# Patient Record
Sex: MALE | Race: WHITE | NOT HISPANIC OR LATINO | Employment: FULL TIME | URBAN - METROPOLITAN AREA
[De-identification: names, ages, dates, MRNs, and addresses within clinical notes are randomized per-mention and may not be internally consistent; named-entity substitution may affect disease eponyms.]

---

## 2017-04-27 ENCOUNTER — GENERIC CONVERSION - ENCOUNTER (OUTPATIENT)
Dept: OTHER | Facility: OTHER | Age: 37
End: 2017-04-27

## 2017-06-20 ENCOUNTER — GENERIC CONVERSION - ENCOUNTER (OUTPATIENT)
Dept: OTHER | Facility: OTHER | Age: 37
End: 2017-06-20

## 2017-08-10 ENCOUNTER — GENERIC CONVERSION - ENCOUNTER (OUTPATIENT)
Dept: OTHER | Facility: OTHER | Age: 37
End: 2017-08-10

## 2017-11-13 ENCOUNTER — ALLSCRIPTS OFFICE VISIT (OUTPATIENT)
Dept: OTHER | Facility: OTHER | Age: 37
End: 2017-11-13

## 2018-01-22 VITALS
HEIGHT: 69 IN | WEIGHT: 230.38 LBS | SYSTOLIC BLOOD PRESSURE: 136 MMHG | BODY MASS INDEX: 34.12 KG/M2 | DIASTOLIC BLOOD PRESSURE: 78 MMHG

## 2018-05-31 DIAGNOSIS — I10 HYPERTENSION, UNSPECIFIED TYPE: Primary | ICD-10-CM

## 2018-05-31 RX ORDER — UREA 10 %
LOTION (ML) TOPICAL
COMMUNITY

## 2018-05-31 RX ORDER — TADALAFIL 5 MG/1
TABLET ORAL DAILY
COMMUNITY
Start: 2016-01-20 | End: 2018-07-09 | Stop reason: SDUPTHER

## 2018-05-31 RX ORDER — HYDROCHLOROTHIAZIDE 12.5 MG/1
1 CAPSULE, GELATIN COATED ORAL DAILY
COMMUNITY
Start: 2016-07-13 | End: 2018-05-31 | Stop reason: SDUPTHER

## 2018-05-31 RX ORDER — HYDROCHLOROTHIAZIDE 12.5 MG/1
12.5 CAPSULE, GELATIN COATED ORAL DAILY
Qty: 90 CAPSULE | Refills: 1 | Status: SHIPPED | OUTPATIENT
Start: 2018-05-31 | End: 2018-10-24 | Stop reason: SDUPTHER

## 2018-05-31 RX ORDER — MESALAMINE 375 MG/1
4 CAPSULE, EXTENDED RELEASE ORAL DAILY
COMMUNITY
End: 2018-07-09 | Stop reason: SDUPTHER

## 2018-07-09 ENCOUNTER — OFFICE VISIT (OUTPATIENT)
Dept: FAMILY MEDICINE CLINIC | Facility: CLINIC | Age: 38
End: 2018-07-09

## 2018-07-09 VITALS
BODY MASS INDEX: 32.14 KG/M2 | DIASTOLIC BLOOD PRESSURE: 80 MMHG | WEIGHT: 217 LBS | HEIGHT: 69 IN | SYSTOLIC BLOOD PRESSURE: 138 MMHG

## 2018-07-09 DIAGNOSIS — N52.2 DRUG-INDUCED ERECTILE DYSFUNCTION: ICD-10-CM

## 2018-07-09 DIAGNOSIS — Z02.4 ENCOUNTER FOR DRIVER'S LICENSE HISTORY AND PHYSICAL: Primary | ICD-10-CM

## 2018-07-09 DIAGNOSIS — K51.90 ULCERATIVE COLITIS WITHOUT COMPLICATIONS, UNSPECIFIED LOCATION (HCC): ICD-10-CM

## 2018-07-09 PROCEDURE — 99499 UNLISTED E&M SERVICE: CPT | Performed by: FAMILY MEDICINE

## 2018-07-09 RX ORDER — TADALAFIL 5 MG/1
5 TABLET ORAL DAILY
Qty: 30 TABLET | Refills: 3 | Status: SHIPPED | OUTPATIENT
Start: 2018-07-09

## 2018-07-09 RX ORDER — MESALAMINE 375 MG/1
1.5 CAPSULE, EXTENDED RELEASE ORAL DAILY
Qty: 360 CAPSULE | Refills: 3 | Status: SHIPPED | OUTPATIENT
Start: 2018-07-09 | End: 2019-09-17 | Stop reason: SDUPTHER

## 2018-07-09 NOTE — PROGRESS NOTES
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  Follow-up as scheduled or p r n  No problem-specific Assessment & Plan notes found for this encounter  Diagnoses and all orders for this visit:    Encounter for 's license history and physical    Ulcerative colitis without complications, unspecified location ([facility] )  -     mesalamine (APRISO) 0 375 g 24 hr capsule; Take 4 capsules (1 5 g total) by mouth daily Before breakfast    Drug-induced erectile dysfunction  -     tadalafil (CIALIS) 5 MG tablet; Take 1 tablet (5 mg total) by mouth daily          Subjective:      Patient ID: Jana Gaucher is a 44 y o  male  Patient here today for 's physical   Patient denies any chest pain or shortness of breath  No history of syncope  Feeling well  Just needs refills on medications otherwise  The following portions of the patient's history were reviewed and updated as appropriate:   He  has no past medical history on file  He   Patient Active Problem List    Diagnosis Date Noted    Diastolic hypertension 96/77/7577    Drug-induced erectile dysfunction 01/20/2016    Ulcerative colitis without complications ([facility] ) 49/51/6884     He  has a past surgical history that includes Mouth surgery  His family history includes Hypertension in his father and mother  He  reports that he has never smoked  He has never used smokeless tobacco  He reports that he drinks alcohol  He reports that he does not use drugs    Current Outpatient Prescriptions   Medication Sig Dispense Refill    folic acid (FOLVITE) 477 MCG tablet Take by mouth      hydrochlorothiazide (MICROZIDE) 12 5 mg capsule Take 1 capsule (12 5 mg total) by mouth daily 90 capsule 1    mesalamine (APRISO) 0 375 g 24 hr capsule Take 4 capsules (1 5 g total) by mouth daily Before breakfast 360 capsule 3    tadalafil (CIALIS) 5 MG tablet Take 1 tablet (5 mg total) by mouth daily 30 tablet 3     No current facility-administered medications for this visit  Current Outpatient Prescriptions on File Prior to Visit   Medication Sig    folic acid (FOLVITE) 230 MCG tablet Take by mouth    hydrochlorothiazide (MICROZIDE) 12 5 mg capsule Take 1 capsule (12 5 mg total) by mouth daily    [DISCONTINUED] mesalamine (APRISO) 0 375 g 24 hr capsule Take 4 capsules by mouth Daily    [DISCONTINUED] tadalafil (CIALIS) 5 MG tablet Take by mouth Daily     No current facility-administered medications on file prior to visit  He has No Known Allergies       Review of Systems   Constitutional: Negative  Respiratory: Negative  Cardiovascular: Negative  Gastrointestinal: Negative  Genitourinary: Negative  Musculoskeletal: Negative  Neurological: Negative  Objective:      /80   Ht 5' 9" (1 753 m)   Wt 98 4 kg (217 lb)   BMI 32 05 kg/m²          Physical Exam   Constitutional: He is oriented to person, place, and time  He appears well-developed and well-nourished  No distress  Cardiovascular: Normal rate, regular rhythm and normal heart sounds  Exam reveals no gallop and no friction rub  No murmur heard  Pulmonary/Chest: Effort normal and breath sounds normal  No respiratory distress  He has no wheezes  He has no rales  Musculoskeletal: He exhibits no edema  Neurological: He is alert and oriented to person, place, and time  Skin: He is not diaphoretic  Psychiatric: He has a normal mood and affect  His behavior is normal  Judgment and thought content normal    Vitals reviewed

## 2018-10-24 DIAGNOSIS — I10 HYPERTENSION, UNSPECIFIED TYPE: ICD-10-CM

## 2018-10-24 RX ORDER — HYDROCHLOROTHIAZIDE 12.5 MG/1
12.5 CAPSULE, GELATIN COATED ORAL DAILY
Qty: 90 CAPSULE | Refills: 3 | Status: SHIPPED | OUTPATIENT
Start: 2018-10-24

## 2018-11-01 ENCOUNTER — IMMUNIZATION (OUTPATIENT)
Dept: FAMILY MEDICINE CLINIC | Facility: CLINIC | Age: 38
End: 2018-11-01
Payer: COMMERCIAL

## 2018-11-01 DIAGNOSIS — Z23 NEED FOR INFLUENZA VACCINATION: Primary | ICD-10-CM

## 2018-11-01 PROCEDURE — 90471 IMMUNIZATION ADMIN: CPT

## 2018-11-01 PROCEDURE — 90686 IIV4 VACC NO PRSV 0.5 ML IM: CPT

## 2019-09-17 DIAGNOSIS — K51.90 ULCERATIVE COLITIS WITHOUT COMPLICATIONS, UNSPECIFIED LOCATION (HCC): ICD-10-CM

## 2019-09-17 RX ORDER — MESALAMINE 375 MG/1
1.5 CAPSULE, EXTENDED RELEASE ORAL DAILY
Qty: 360 CAPSULE | Refills: 0 | Status: SHIPPED | OUTPATIENT
Start: 2019-09-17

## 2024-05-04 ENCOUNTER — APPOINTMENT (EMERGENCY)
Dept: CT IMAGING | Facility: HOSPITAL | Age: 44
End: 2024-05-04
Payer: COMMERCIAL

## 2024-05-04 ENCOUNTER — HOSPITAL ENCOUNTER (EMERGENCY)
Facility: HOSPITAL | Age: 44
Discharge: HOME | End: 2024-05-04
Attending: STUDENT IN AN ORGANIZED HEALTH CARE EDUCATION/TRAINING PROGRAM | Admitting: STUDENT IN AN ORGANIZED HEALTH CARE EDUCATION/TRAINING PROGRAM
Payer: COMMERCIAL

## 2024-05-04 VITALS
HEIGHT: 69 IN | HEART RATE: 78 BPM | OXYGEN SATURATION: 100 % | RESPIRATION RATE: 18 BRPM | WEIGHT: 260 LBS | SYSTOLIC BLOOD PRESSURE: 151 MMHG | BODY MASS INDEX: 38.51 KG/M2 | TEMPERATURE: 98.1 F | DIASTOLIC BLOOD PRESSURE: 111 MMHG

## 2024-05-04 DIAGNOSIS — R42 VERTIGO: Primary | ICD-10-CM

## 2024-05-04 LAB
ALBUMIN SERPL BCP-MCNC: 4.3 G/DL (ref 3.5–5)
ALP SERPL-CCNC: 54 U/L (ref 34–104)
ALT SERPL W P-5'-P-CCNC: 32 U/L (ref 7–52)
ANION GAP SERPL CALCULATED.3IONS-SCNC: 7 MMOL/L (ref 4–13)
AST SERPL W P-5'-P-CCNC: 22 U/L (ref 13–39)
BASOPHILS # BLD AUTO: 0.04 THOUSANDS/ÂΜL (ref 0–0.1)
BASOPHILS NFR BLD AUTO: 1 % (ref 0–1)
BILIRUB SERPL-MCNC: 1.4 MG/DL (ref 0.2–1)
BUN SERPL-MCNC: 20 MG/DL (ref 5–25)
CALCIUM SERPL-MCNC: 8.9 MG/DL (ref 8.4–10.2)
CARDIAC TROPONIN I PNL SERPL HS: 3 NG/L
CHLORIDE SERPL-SCNC: 107 MMOL/L (ref 96–108)
CO2 SERPL-SCNC: 25 MMOL/L (ref 21–32)
CREAT SERPL-MCNC: 0.98 MG/DL (ref 0.6–1.3)
EOSINOPHIL # BLD AUTO: 0.05 THOUSAND/ÂΜL (ref 0–0.61)
EOSINOPHIL NFR BLD AUTO: 1 % (ref 0–6)
ERYTHROCYTE [DISTWIDTH] IN BLOOD BY AUTOMATED COUNT: 12.5 % (ref 11.6–15.1)
GFR SERPL CREATININE-BSD FRML MDRD: 93 ML/MIN/1.73SQ M
GLUCOSE SERPL-MCNC: 132 MG/DL (ref 65–140)
HCT VFR BLD AUTO: 43 % (ref 36.5–49.3)
HGB BLD-MCNC: 14.2 G/DL (ref 12–17)
IMM GRANULOCYTES # BLD AUTO: 0.03 THOUSAND/UL (ref 0–0.2)
IMM GRANULOCYTES NFR BLD AUTO: 0 % (ref 0–2)
LYMPHOCYTES # BLD AUTO: 1.7 THOUSANDS/ÂΜL (ref 0.6–4.47)
LYMPHOCYTES NFR BLD AUTO: 20 % (ref 14–44)
MCH RBC QN AUTO: 28.6 PG (ref 26.8–34.3)
MCHC RBC AUTO-ENTMCNC: 33 G/DL (ref 31.4–37.4)
MCV RBC AUTO: 87 FL (ref 82–98)
MONOCYTES # BLD AUTO: 0.46 THOUSAND/ÂΜL (ref 0.17–1.22)
MONOCYTES NFR BLD AUTO: 5 % (ref 4–12)
NEUTROPHILS # BLD AUTO: 6.42 THOUSANDS/ÂΜL (ref 1.85–7.62)
NEUTS SEG NFR BLD AUTO: 73 % (ref 43–75)
NRBC BLD AUTO-RTO: 0 /100 WBCS
PLATELET # BLD AUTO: 180 THOUSANDS/UL (ref 149–390)
PMV BLD AUTO: 10 FL (ref 8.9–12.7)
POTASSIUM SERPL-SCNC: 3.9 MMOL/L (ref 3.5–5.3)
PROT SERPL-MCNC: 6.8 G/DL (ref 6.4–8.4)
RBC # BLD AUTO: 4.96 MILLION/UL (ref 3.88–5.62)
SODIUM SERPL-SCNC: 139 MMOL/L (ref 135–147)
WBC # BLD AUTO: 8.7 THOUSAND/UL (ref 4.31–10.16)

## 2024-05-04 PROCEDURE — 80053 COMPREHEN METABOLIC PANEL: CPT | Performed by: PHYSICIAN ASSISTANT

## 2024-05-04 PROCEDURE — 99285 EMERGENCY DEPT VISIT HI MDM: CPT

## 2024-05-04 PROCEDURE — 96361 HYDRATE IV INFUSION ADD-ON: CPT

## 2024-05-04 PROCEDURE — 93005 ELECTROCARDIOGRAM TRACING: CPT

## 2024-05-04 PROCEDURE — 96374 THER/PROPH/DIAG INJ IV PUSH: CPT

## 2024-05-04 PROCEDURE — 70496 CT ANGIOGRAPHY HEAD: CPT

## 2024-05-04 PROCEDURE — 36415 COLL VENOUS BLD VENIPUNCTURE: CPT | Performed by: PHYSICIAN ASSISTANT

## 2024-05-04 PROCEDURE — 99285 EMERGENCY DEPT VISIT HI MDM: CPT | Performed by: PHYSICIAN ASSISTANT

## 2024-05-04 PROCEDURE — 85025 COMPLETE CBC W/AUTO DIFF WBC: CPT | Performed by: PHYSICIAN ASSISTANT

## 2024-05-04 PROCEDURE — 84484 ASSAY OF TROPONIN QUANT: CPT | Performed by: PHYSICIAN ASSISTANT

## 2024-05-04 PROCEDURE — 70498 CT ANGIOGRAPHY NECK: CPT

## 2024-05-04 PROCEDURE — 96375 TX/PRO/DX INJ NEW DRUG ADDON: CPT

## 2024-05-04 RX ORDER — ONDANSETRON 4 MG/1
4 TABLET, FILM COATED ORAL EVERY 6 HOURS
Qty: 12 TABLET | Refills: 0 | Status: SHIPPED | OUTPATIENT
Start: 2024-05-04

## 2024-05-04 RX ORDER — PREDNISONE 20 MG/1
40 TABLET ORAL DAILY
Qty: 10 TABLET | Refills: 0 | Status: SHIPPED | OUTPATIENT
Start: 2024-05-04 | End: 2024-05-09

## 2024-05-04 RX ORDER — MECLIZINE HYDROCHLORIDE 25 MG/1
25 TABLET ORAL EVERY 8 HOURS PRN
Qty: 20 TABLET | Refills: 0 | Status: SHIPPED | OUTPATIENT
Start: 2024-05-04

## 2024-05-04 RX ORDER — MECLIZINE HYDROCHLORIDE 25 MG/1
25 TABLET ORAL ONCE
Status: COMPLETED | OUTPATIENT
Start: 2024-05-04 | End: 2024-05-04

## 2024-05-04 RX ORDER — DIPHENHYDRAMINE HYDROCHLORIDE 50 MG/ML
50 INJECTION INTRAMUSCULAR; INTRAVENOUS ONCE
Status: COMPLETED | OUTPATIENT
Start: 2024-05-04 | End: 2024-05-04

## 2024-05-04 RX ORDER — DEXAMETHASONE SODIUM PHOSPHATE 10 MG/ML
10 INJECTION, SOLUTION INTRAMUSCULAR; INTRAVENOUS ONCE
Status: COMPLETED | OUTPATIENT
Start: 2024-05-04 | End: 2024-05-04

## 2024-05-04 RX ORDER — DIAZEPAM 5 MG/ML
5 INJECTION, SOLUTION INTRAMUSCULAR; INTRAVENOUS ONCE
Status: COMPLETED | OUTPATIENT
Start: 2024-05-04 | End: 2024-05-04

## 2024-05-04 RX ADMIN — DIPHENHYDRAMINE HYDROCHLORIDE 50 MG: 50 INJECTION, SOLUTION INTRAMUSCULAR; INTRAVENOUS at 15:37

## 2024-05-04 RX ADMIN — MECLIZINE HYDROCHLORIDE 25 MG: 25 TABLET ORAL at 13:06

## 2024-05-04 RX ADMIN — IOHEXOL 100 ML: 350 INJECTION, SOLUTION INTRAVENOUS at 14:23

## 2024-05-04 RX ADMIN — DIAZEPAM 5 MG: 5 INJECTION INTRAMUSCULAR; INTRAVENOUS at 13:07

## 2024-05-04 RX ADMIN — DEXAMETHASONE SODIUM PHOSPHATE 10 MG: 10 INJECTION, SOLUTION INTRAMUSCULAR; INTRAVENOUS at 15:38

## 2024-05-04 RX ADMIN — SODIUM CHLORIDE 1000 ML: 0.9 INJECTION, SOLUTION INTRAVENOUS at 13:03

## 2024-05-04 NOTE — ED PROVIDER NOTES
History  Chief Complaint   Patient presents with    Dizziness     Started with dizzy spells yesterday around 1000. Last evening had dizziness nausea/vomiting and was unable to walk in a straight line. Has had 4 dizzy spells since yesterday     Patient is a 43-year-old male with past medical history of hypertension who presents  today with chief complaint of having intermittent dizzy spells over the last few days.  The patient states that yesterday he had 3 intermittent bouts of dizziness passed.  States that he feels as though he is drunk without the alcohol.  States that most of the times it is occurred and has been when he is was bending over to pick things up off the floor or changing positions. he states that yesterday he did have nausea and vomiting from it..  States that he was sick with bronchitis mid March.      Dizziness  Quality:  Imbalance and room spinning  Duration:  2 days  Timing:  Sporadic  Chronicity:  New  Context: bending over    Associated symptoms: no chest pain, no palpitations, no shortness of breath and no vision changes        Prior to Admission Medications   Prescriptions Last Dose Informant Patient Reported? Taking?   folic acid (FOLVITE) 800 MCG tablet   Yes No   Sig: Take by mouth   hydrochlorothiazide (MICROZIDE) 12.5 mg capsule   No No   Sig: Take 1 capsule (12.5 mg total) by mouth daily   mesalamine (APRISO) 0.375 g 24 hr capsule   No No   Sig: Take 4 capsules (1.5 g total) by mouth daily Before breakfast   tadalafil (CIALIS) 5 MG tablet   No No   Sig: Take 1 tablet (5 mg total) by mouth daily      Facility-Administered Medications: None       History reviewed. No pertinent past medical history.    Past Surgical History:   Procedure Laterality Date    MOUTH SURGERY         Family History   Problem Relation Age of Onset    Hypertension Mother     Hypertension Father      I have reviewed and agree with the history as documented.    E-Cigarette/Vaping     E-Cigarette/Vaping Substances      Social History     Tobacco Use    Smoking status: Never    Smokeless tobacco: Never   Substance Use Topics    Alcohol use: Yes     Comment: occassional    Drug use: No       Review of Systems   Respiratory:  Negative for shortness of breath.    Cardiovascular:  Negative for chest pain and palpitations.   Neurological:  Positive for dizziness.   All other systems reviewed and are negative.      Physical Exam  Physical Exam  Vitals and nursing note reviewed.   Constitutional:       General: He is not in acute distress.     Appearance: He is well-developed.   HENT:      Head: Normocephalic and atraumatic.      Right Ear: Tympanic membrane normal.      Left Ear: Tympanic membrane normal.      Nose: Nose normal.      Mouth/Throat:      Mouth: Mucous membranes are moist.   Eyes:      Extraocular Movements: Extraocular movements intact.      Right eye: No nystagmus.      Left eye: No nystagmus.      Pupils: Pupils are equal, round, and reactive to light.   Cardiovascular:      Rate and Rhythm: Normal rate and regular rhythm.      Heart sounds: No murmur heard.  Pulmonary:      Effort: Pulmonary effort is normal. No respiratory distress.      Breath sounds: Normal breath sounds.   Abdominal:      General: Bowel sounds are normal.      Palpations: Abdomen is soft.      Tenderness: There is no abdominal tenderness.   Musculoskeletal:      Cervical back: Normal range of motion.   Skin:     General: Skin is warm and dry.      Capillary Refill: Capillary refill takes less than 2 seconds.   Neurological:      General: No focal deficit present.      Mental Status: He is alert and oriented to person, place, and time.   Psychiatric:         Behavior: Behavior normal.         Vital Signs  ED Triage Vitals   Temperature Pulse Respirations Blood Pressure SpO2   05/04/24 1231 05/04/24 1231 05/04/24 1231 05/04/24 1239 05/04/24 1231   98.1 °F (36.7 °C) 74 18 (S) (!) 162/116 97 %      Temp Source Heart Rate Source Patient Position -  Orthostatic VS BP Location FiO2 (%)   05/04/24 1231 05/04/24 1231 05/04/24 1231 05/04/24 1231 --   Temporal Monitor Sitting Left arm       Pain Score       05/04/24 1245       No Pain           Vitals:    05/04/24 1231 05/04/24 1239 05/04/24 1531   BP:  (S) (!) 162/116 (!) 151/111   Pulse: 74  78   Patient Position - Orthostatic VS: Sitting  Sitting         Visual Acuity  Visual Acuity      Flowsheet Row Most Recent Value   L Pupil Size (mm) 3   R Pupil Size (mm) 3            ED Medications  Medications   sodium chloride 0.9 % bolus 1,000 mL (0 mL Intravenous Stopped 5/4/24 1448)   diazepam (VALIUM) injection 5 mg (5 mg Intravenous Given 5/4/24 1307)   meclizine (ANTIVERT) tablet 25 mg (25 mg Oral Given 5/4/24 1306)   iohexol (OMNIPAQUE) 350 MG/ML injection (MULTI-DOSE) 100 mL (100 mL Intravenous Given 5/4/24 1423)   dexamethasone (PF) (DECADRON) injection 10 mg (10 mg Intravenous Given 5/4/24 1538)   diphenhydrAMINE (BENADRYL) injection 50 mg (50 mg Intravenous Given 5/4/24 1537)       Diagnostic Studies  Results Reviewed       Procedure Component Value Units Date/Time    HS Troponin 0hr (reflex protocol) [[id number]]  (Normal) Collected: 05/04/24 1302    Lab Status: Final result Specimen: Blood from Arm, Left Updated: 05/04/24 1336     hs TnI 0hr 3 ng/L     Comprehensive metabolic panel [[id number]]  (Abnormal) Collected: 05/04/24 1302    Lab Status: Final result Specimen: Blood from Arm, Left Updated: 05/04/24 1329     Sodium 139 mmol/L      Potassium 3.9 mmol/L      Chloride 107 mmol/L      CO2 25 mmol/L      ANION GAP 7 mmol/L      BUN 20 mg/dL      Creatinine 0.98 mg/dL      Glucose 132 mg/dL      Calcium 8.9 mg/dL      AST 22 U/L      ALT 32 U/L      Alkaline Phosphatase 54 U/L      Total Protein 6.8 g/dL      Albumin 4.3 g/dL      Total Bilirubin 1.40 mg/dL      eGFR 93 ml/min/1.73sq m     Narrative:      National Kid[employer] guidelines for Chronic Kidney Disease (CKD):     Stage 1 with normal or  high GFR (GFR > 90 mL/min/1.73 square meters)    Stage 2 Mild CKD (GFR = 60-89 mL/min/1.73 square meters)    Stage 3A Moderate CKD (GFR = 45-59 mL/min/1.73 square meters)    Stage 3B Moderate CKD (GFR = 30-44 mL/min/1.73 square meters)    Stage 4 Severe CKD (GFR = 15-29 mL/min/1.73 square meters)    Stage 5 End Stage CKD (GFR <15 mL/min/1.73 square meters)  Note: GFR calculation is accurate only with a steady state creatinine    CBC and differential [[id number]] Collected: 05/04/24 1302    Lab Status: Final result Specimen: Blood from Arm, Left Updated: 05/04/24 1308     WBC 8.70 Thousand/uL      RBC 4.96 Million/uL      Hemoglobin 14.2 g/dL      Hematocrit 43.0 %      MCV 87 fL      MCH 28.6 pg      MCHC 33.0 g/dL      RDW 12.5 %      MPV 10.0 fL      Platelets 180 Thousands/uL      nRBC 0 /100 WBCs      Segmented % 73 %      Immature Grans % 0 %      Lymphocytes % 20 %      Monocytes % 5 %      Eosinophils Relative 1 %      Basophils Relative 1 %      Absolute Neutrophils 6.42 Thousands/µL      Absolute Immature Grans 0.03 Thousand/uL      Absolute Lymphocytes 1.70 Thousands/µL      Absolute Monocytes 0.46 Thousand/µL      Eosinophils Absolute 0.05 Thousand/µL      Basophils Absolute 0.04 Thousands/µL                    CTA head and neck with and without contrast   Final Result by Tanner Chris MD (05/04 6535)         1.  No hemodynamically significant stenosis in the major arteries of the neck.   2.  No intracranial aneurysm or major intracranial arterial stenosis.   3.  No acute intracranial hemorrhage.                  Workstation performed: [id number]                    Procedures  ECG 12 Lead Documentation Only    Date/Time: 5/4/2024 1:00 PM    Performed by: Valentino Palacios PA-C  Authorized by: Valentino Palacios PA-C    Indications / Diagnosis:  Dizziness  Patient location:  ED  Previous ECG:     Previous ECG:  Unavailable  Interpretation:     Interpretation: non-specific    Rate:     ECG rate:   69    ECG rate assessment: normal    Rhythm:     Rhythm: sinus rhythm             ED Course                               SBIRT 20yo+      Flowsheet Row Most Recent Value   Initial Alcohol Screen: US AUDIT-C     1. How often do you have a drink containing alcohol? 0 Filed at: 05/04/2024 1234   2. How many drinks containing alcohol do you have on a typical day you are drinking?  0 Filed at: 05/04/2024 1234   3a. Male UNDER 65: How often do you have five or more drinks on one occasion? 0 Filed at: 05/04/2024 1234   Audit-C Score 0 Filed at: 05/04/2024 1234   TOM: How many times in the past year have you...    Used an illegal drug or used a prescription medication for non-medical reasons? Never Filed at: 05/04/2024 1234                      Medical Decision Making  Patient is a 43-year-old male with past medical history of hypertension who presents  today with chief complaint of having intermittent dizzy spells over the last few days.  The patient states that yesterday he had 3 intermittent bouts of dizziness passed.  States that he feels as though he is drunk without the alcohol.  States that most of the times it is occurred and has been when he is was bending over to pick things up off the floor or changing positions. he states that yesterday he did have nausea and vomiting from it..  States that he was sick with bronchitis mid March.    She did have hypertension.  Patient has a history of this but this did improve while in the emergency department but still present.  Patient did not have any recurrent episodes of dizziness in the emergency department.  Seems to be more vertiginous in nature due to bending over.  Patient was treated for vertiginous etiology with meclizine.  Patient educated on blood pressure readings.  Patient's blood pressure did improve but was still slightly elevated and no dizziness.  Did recommend follow-up with PCP for elevated blood pressure.  Expressed understanding and agreement treatment  plan    Differential included but was not limited to ACS, electrolyte abnormality, CKD, intracranial abnormality, vertigo, hyppertensive urgency    Amount and/or Complexity of Data Reviewed  Labs: ordered. Decision-making details documented in ED Course.  Radiology: ordered and independent interpretation performed. Decision-making details documented in ED Course.  ECG/medicine tests: ordered and independent interpretation performed. Decision-making details documented in ED Course.    Risk  Prescription drug management.             Disposition  Final diagnoses:   Vertigo     Time reflects when diagnosis was documented in both MDM as applicable and the Disposition within this note       Time User Action Codes Description Comment    5/4/2024  3:35 PM Valentino Palacios Add [R42] Vertigo           ED Disposition       ED Disposition   Discharge    Condition   Stable    Date/Time   Sat May 4, 2024 1535    Comment   Jermain MCCLELLAN Felty discharge to home/self care.                   Follow-up Information       Follow up With Specialties Details Why Contact Info    Ignacio Barrientos MD Otolaryngology Schedule an appointment as soon as possible for a visit   [address] [place] [id number] [phone]              Discharge Medication List as of 5/4/2024  3:37 PM        START taking these medications    Details   meclizine (ANTIVERT) 25 mg tablet Take 1 tablet (25 mg total) by mouth every 8 (eight) hours as needed for dizziness, Starting Sat 5/4/2024, Normal      ondansetron (ZOFRAN) 4 mg tablet Take 1 tablet (4 mg total) by mouth every 6 (six) hours, Starting Sat 5/4/2024, Normal      predniSONE 20 mg tablet Take 2 tablets (40 mg total) by mouth daily for 5 days, Starting Sat 5/4/2024, Until Thu 5/9/2024, Normal           CONTINUE these medications which have NOT CHANGED    Details   folic acid (FOLVITE) 800 MCG tablet Take by mouth, Historical Med      hydrochlorothiazide (MICROZIDE) 12.5 mg capsule Take 1  capsule (12.5 mg total) by mouth daily, Starting Wed 10/24/2018, Normal      mesalamine (APRISO) 0.375 g 24 hr capsule Take 4 capsules (1.5 g total) by mouth daily Before breakfast, Starting Tue 9/17/2019, Normal      tadalafil (CIALIS) 5 MG tablet Take 1 tablet (5 mg total) by mouth daily, Starting Mon 7/9/2018, Print                 PDMP Review       None            ED Provider  Electronically Signed by             Valentino Palacios PA-C  05/04/24 0079

## 2024-05-05 LAB
ATRIAL RATE: 69 BPM
P AXIS: 30 DEGREES
PR INTERVAL: 148 MS
QRS AXIS: 21 DEGREES
QRSD INTERVAL: 84 MS
QT INTERVAL: 402 MS
QTC INTERVAL: 430 MS
T WAVE AXIS: 112 DEGREES
VENTRICULAR RATE: 69 BPM

## 2024-05-05 PROCEDURE — 93010 ELECTROCARDIOGRAM REPORT: CPT | Performed by: INTERNAL MEDICINE

## 2024-10-17 ENCOUNTER — APPOINTMENT (OUTPATIENT)
Dept: URGENT CARE | Facility: MEDICAL CENTER | Age: 44
End: 2024-10-17

## 2025-07-09 ENCOUNTER — APPOINTMENT (OUTPATIENT)
Dept: URGENT CARE | Facility: MEDICAL CENTER | Age: 45
End: 2025-07-09